# Patient Record
Sex: FEMALE | Race: BLACK OR AFRICAN AMERICAN | NOT HISPANIC OR LATINO | URBAN - METROPOLITAN AREA
[De-identification: names, ages, dates, MRNs, and addresses within clinical notes are randomized per-mention and may not be internally consistent; named-entity substitution may affect disease eponyms.]

---

## 2023-06-27 ENCOUNTER — OFFICE VISIT (OUTPATIENT)
Dept: URBAN - METROPOLITAN AREA CLINIC 48 | Facility: CLINIC | Age: 63
End: 2023-06-27
Payer: COMMERCIAL

## 2023-06-27 ENCOUNTER — DASHBOARD ENCOUNTERS (OUTPATIENT)
Age: 63
End: 2023-06-27

## 2023-06-27 ENCOUNTER — LAB OUTSIDE AN ENCOUNTER (OUTPATIENT)
Dept: URBAN - METROPOLITAN AREA CLINIC 48 | Facility: CLINIC | Age: 63
End: 2023-06-27

## 2023-06-27 VITALS
DIASTOLIC BLOOD PRESSURE: 80 MMHG | BODY MASS INDEX: 44.9 KG/M2 | WEIGHT: 253.4 LBS | OXYGEN SATURATION: 91 % | HEART RATE: 93 BPM | HEIGHT: 63 IN | SYSTOLIC BLOOD PRESSURE: 138 MMHG | TEMPERATURE: 97.3 F

## 2023-06-27 DIAGNOSIS — K21.9 GERD WITHOUT ESOPHAGITIS: ICD-10-CM

## 2023-06-27 DIAGNOSIS — R68.81 EARLY SATIETY: ICD-10-CM

## 2023-06-27 DIAGNOSIS — R10.13 EPIGASTRIC PAIN: ICD-10-CM

## 2023-06-27 DIAGNOSIS — R11.0 NAUSEA: ICD-10-CM

## 2023-06-27 PROBLEM — 422587007: Status: ACTIVE | Noted: 2023-06-27

## 2023-06-27 PROBLEM — 79922009: Status: ACTIVE | Noted: 2023-06-27

## 2023-06-27 PROBLEM — 442076002: Status: ACTIVE | Noted: 2023-06-27

## 2023-06-27 PROBLEM — 266435005: Status: ACTIVE | Noted: 2023-06-27

## 2023-06-27 PROCEDURE — 99204 OFFICE O/P NEW MOD 45 MIN: CPT | Performed by: INTERNAL MEDICINE

## 2023-06-27 RX ORDER — TRAMADOL HYDROCHLORIDE 50 MG/1
1 TABLET AS NEEDED TABLET ORAL ONCE A DAY
Qty: 5 TABLET | Refills: 0 | Status: ACTIVE | COMMUNITY

## 2023-06-27 RX ORDER — SEVELAMER CARBONATE 800 MG/1
1 TABLET WITH MEALS TABLET, FILM COATED ORAL THREE TIMES A DAY
Qty: 270 TABLET | Status: ACTIVE | COMMUNITY

## 2023-06-27 RX ORDER — FAMOTIDINE 40 MG/1
1 TABLET AS NEEDED FOR REFLUX TABLET, FILM COATED ORAL ONCE A DAY
Qty: 30 TABLET | Refills: 3 | OUTPATIENT
Start: 2023-06-27

## 2023-06-27 RX ORDER — MYCOPHENOLATE MOFETIL 500 MG/1
1 TABLET TABLET, FILM COATED ORAL TWICE A DAY
Qty: 180 TABLET | Refills: 1 | Status: ACTIVE | COMMUNITY

## 2023-06-27 RX ORDER — LISINOPRIL 40 MG/1
1 TABLET TABLET ORAL ONCE A DAY
Qty: 90 TABLET | Refills: 0 | Status: ACTIVE | COMMUNITY

## 2023-06-27 RX ORDER — DICLOFENAC SODIUM 75 MG/1
1 TABLET AS NEEDED TABLET, DELAYED RELEASE ORAL TWICE A DAY
Qty: 60 TABLET | Status: ACTIVE | COMMUNITY

## 2023-06-27 RX ORDER — AMLODIPINE BESYLATE 10 MG/1
1 TABLET TABLET ORAL ONCE A DAY
Qty: 90 TABLET | Status: ACTIVE | COMMUNITY

## 2023-06-27 RX ORDER — DAPAGLIFLOZIN 10 MG/1
1 TABLET TABLET, FILM COATED ORAL ONCE A DAY
Qty: 90 TABLET | Status: ACTIVE | COMMUNITY

## 2023-06-27 RX ORDER — DULAGLUTIDE 0.75 MG/.5ML
INJECT 0.5 (ONE-HALF) MLS SUBCUTANEOUSLY ONCE A WEEK INJECTION, SOLUTION SUBCUTANEOUS
Qty: 4 MILLILITER | Refills: 1 | Status: ACTIVE | COMMUNITY

## 2023-06-27 RX ORDER — PREDNISONE 20 MG/1
1 TABLET TABLET ORAL ONCE A DAY
Qty: 30 TABLET | Status: ACTIVE | COMMUNITY

## 2023-06-27 RX ORDER — DOXYCYCLINE HYCLATE 100 MG/1
1 CAPSULE CAPSULE ORAL ONCE A DAY
Qty: 10 CAPSULE | Refills: 0 | Status: ACTIVE | COMMUNITY

## 2023-06-27 RX ORDER — GABAPENTIN 300 MG/1
1 CAPSULE CAPSULE ORAL ONCE A DAY
Qty: 30 CAPSULE | Refills: 5 | Status: ACTIVE | COMMUNITY

## 2023-06-27 RX ORDER — OXYCODONE HYDROCHLORIDE AND ACETAMINOPHEN 5; 325 MG/1; MG/1
1 TABLET AS NEEDED TABLET ORAL
Qty: 12 TABLET | Refills: 0 | Status: ACTIVE | COMMUNITY

## 2023-06-27 NOTE — HPI-TODAY'S VISIT:
62 year old female presents for evaluation of abdominal discomfort.  She has early satiety, reflux, nausea, and abdominal fullness. Epigastric to esophageal discomfort occurs intermittently. The patient had a gastric sleeve about 7 years ago. She began having intermittent GERD at the beginning of the year and it is worsening. Last EGD was prior to gastric sleeve. She has not taken NSAIDs in 6 months. The patient was diagnosed with diabetes 15 years ago. Her medication list includes Diclofenac but she isn't sure if she is on it, and used to take narcotics. Bowel movements are daily of formed stool. Last colonoscopy was in 2022 and she reports it was normal with 10 year recall.

## 2023-07-14 ENCOUNTER — TELEPHONE ENCOUNTER (OUTPATIENT)
Dept: URBAN - METROPOLITAN AREA CLINIC 44 | Facility: CLINIC | Age: 63
End: 2023-07-14

## 2023-07-14 ENCOUNTER — OUT OF OFFICE VISIT (OUTPATIENT)
Dept: URBAN - METROPOLITAN AREA SURGERY CENTER 27 | Facility: SURGERY CENTER | Age: 63
End: 2023-07-14
Payer: COMMERCIAL

## 2023-07-14 ENCOUNTER — CLAIMS CREATED FROM THE CLAIM WINDOW (OUTPATIENT)
Dept: URBAN - METROPOLITAN AREA CLINIC 4 | Facility: CLINIC | Age: 63
End: 2023-07-14
Payer: COMMERCIAL

## 2023-07-14 DIAGNOSIS — K21.9 ACID REFLUX: ICD-10-CM

## 2023-07-14 DIAGNOSIS — K31.89 ACQUIRED DEFORMITY OF DUODENUM: ICD-10-CM

## 2023-07-14 DIAGNOSIS — K30 ACID INDIGESTION: ICD-10-CM

## 2023-07-14 DIAGNOSIS — K31.89 OTHER DISEASES OF STOMACH AND DUODENUM: ICD-10-CM

## 2023-07-14 DIAGNOSIS — K30 DYSPEPSIA: ICD-10-CM

## 2023-07-14 DIAGNOSIS — R10.13 INDIGESTION: ICD-10-CM

## 2023-07-14 DIAGNOSIS — K21.00 GASTRO-ESOPHAGEAL REFLUX DISEASE WITH ESOPHAGITIS: ICD-10-CM

## 2023-07-14 DIAGNOSIS — K20.80 ESOPHAGITIS, LOS ANGELES GRADE A: ICD-10-CM

## 2023-07-14 PROCEDURE — 43239 EGD BIOPSY SINGLE/MULTIPLE: CPT | Performed by: INTERNAL MEDICINE

## 2023-07-14 PROCEDURE — 88312 SPECIAL STAINS GROUP 1: CPT | Performed by: PATHOLOGY

## 2023-07-14 PROCEDURE — 00731 ANES UPR GI NDSC PX NOS: CPT | Performed by: ANESTHESIOLOGY

## 2023-07-14 PROCEDURE — G8907 PT DOC NO EVENTS ON DISCHARG: HCPCS | Performed by: INTERNAL MEDICINE

## 2023-07-14 PROCEDURE — 88305 TISSUE EXAM BY PATHOLOGIST: CPT | Performed by: PATHOLOGY

## 2023-07-14 RX ORDER — PREDNISONE 20 MG/1
1 TABLET TABLET ORAL ONCE A DAY
Qty: 30 TABLET | Status: ACTIVE | COMMUNITY

## 2023-07-14 RX ORDER — PANTOPRAZOLE SODIUM 40 MG/1
1 TABLET TABLET, DELAYED RELEASE ORAL ONCE A DAY
Qty: 90 TABLET | Refills: 3 | OUTPATIENT
Start: 2023-07-14

## 2023-07-14 RX ORDER — DOXYCYCLINE HYCLATE 100 MG/1
1 CAPSULE CAPSULE ORAL ONCE A DAY
Qty: 10 CAPSULE | Refills: 0 | Status: ACTIVE | COMMUNITY

## 2023-07-14 RX ORDER — DAPAGLIFLOZIN 10 MG/1
1 TABLET TABLET, FILM COATED ORAL ONCE A DAY
Qty: 90 TABLET | Status: ACTIVE | COMMUNITY

## 2023-07-14 RX ORDER — DULAGLUTIDE 0.75 MG/.5ML
INJECT 0.5 (ONE-HALF) MLS SUBCUTANEOUSLY ONCE A WEEK INJECTION, SOLUTION SUBCUTANEOUS
Qty: 4 MILLILITER | Refills: 1 | Status: ACTIVE | COMMUNITY

## 2023-07-14 RX ORDER — SEVELAMER CARBONATE 800 MG/1
1 TABLET WITH MEALS TABLET, FILM COATED ORAL THREE TIMES A DAY
Qty: 270 TABLET | Status: ACTIVE | COMMUNITY

## 2023-07-14 RX ORDER — FAMOTIDINE 40 MG/1
1 TABLET AS NEEDED FOR REFLUX TABLET, FILM COATED ORAL ONCE A DAY
Qty: 30 TABLET | Refills: 3 | Status: ACTIVE | COMMUNITY
Start: 2023-06-27

## 2023-07-14 RX ORDER — AMLODIPINE BESYLATE 10 MG/1
1 TABLET TABLET ORAL ONCE A DAY
Qty: 90 TABLET | Status: ACTIVE | COMMUNITY

## 2023-07-14 RX ORDER — GABAPENTIN 300 MG/1
1 CAPSULE CAPSULE ORAL ONCE A DAY
Qty: 30 CAPSULE | Refills: 5 | Status: ACTIVE | COMMUNITY

## 2023-07-14 RX ORDER — OXYCODONE HYDROCHLORIDE AND ACETAMINOPHEN 5; 325 MG/1; MG/1
1 TABLET AS NEEDED TABLET ORAL
Qty: 12 TABLET | Refills: 0 | Status: ACTIVE | COMMUNITY

## 2023-07-14 RX ORDER — TRAMADOL HYDROCHLORIDE 50 MG/1
1 TABLET AS NEEDED TABLET ORAL ONCE A DAY
Qty: 5 TABLET | Refills: 0 | Status: ACTIVE | COMMUNITY

## 2023-07-14 RX ORDER — DICLOFENAC SODIUM 75 MG/1
1 TABLET AS NEEDED TABLET, DELAYED RELEASE ORAL TWICE A DAY
Qty: 60 TABLET | Status: ACTIVE | COMMUNITY

## 2023-07-14 RX ORDER — LISINOPRIL 40 MG/1
1 TABLET TABLET ORAL ONCE A DAY
Qty: 90 TABLET | Refills: 0 | Status: ACTIVE | COMMUNITY

## 2023-07-14 RX ORDER — MYCOPHENOLATE MOFETIL 500 MG/1
1 TABLET TABLET, FILM COATED ORAL TWICE A DAY
Qty: 180 TABLET | Refills: 1 | Status: ACTIVE | COMMUNITY